# Patient Record
Sex: FEMALE | Race: WHITE | NOT HISPANIC OR LATINO | URBAN - METROPOLITAN AREA
[De-identification: names, ages, dates, MRNs, and addresses within clinical notes are randomized per-mention and may not be internally consistent; named-entity substitution may affect disease eponyms.]

---

## 2017-02-11 ENCOUNTER — EMERGENCY (EMERGENCY)
Facility: HOSPITAL | Age: 67
LOS: 1 days | Discharge: HOME | End: 2017-02-11
Attending: EMERGENCY MEDICINE | Admitting: EMERGENCY MEDICINE
Payer: COMMERCIAL

## 2017-02-11 VITALS
HEIGHT: 64 IN | DIASTOLIC BLOOD PRESSURE: 92 MMHG | HEART RATE: 78 BPM | TEMPERATURE: 98 F | WEIGHT: 134.92 LBS | OXYGEN SATURATION: 98 % | RESPIRATION RATE: 20 BRPM | SYSTOLIC BLOOD PRESSURE: 161 MMHG

## 2017-02-11 DIAGNOSIS — K46.9 UNSPECIFIED ABDOMINAL HERNIA WITHOUT OBSTRUCTION OR GANGRENE: ICD-10-CM

## 2017-02-11 DIAGNOSIS — I10 ESSENTIAL (PRIMARY) HYPERTENSION: ICD-10-CM

## 2017-02-11 DIAGNOSIS — Z98.891 HISTORY OF UTERINE SCAR FROM PREVIOUS SURGERY: Chronic | ICD-10-CM

## 2017-02-11 PROCEDURE — 99284 EMERGENCY DEPT VISIT MOD MDM: CPT | Mod: 25

## 2017-02-11 PROCEDURE — 99284 EMERGENCY DEPT VISIT MOD MDM: CPT

## 2017-02-11 RX ORDER — MORPHINE SULFATE 50 MG/1
4 CAPSULE, EXTENDED RELEASE ORAL ONCE
Qty: 0 | Refills: 0 | Status: DISCONTINUED | OUTPATIENT
Start: 2017-02-11 | End: 2017-02-11

## 2017-02-11 NOTE — ED PROVIDER NOTE - GASTROINTESTINAL, MLM
Abdomen soft, + hernia to epigastric region. non reducible. mild tenderness over hernia otherwise remainder of abd non tender. + BS. no guarding .no rebound.

## 2017-02-11 NOTE — ED PROVIDER NOTE - ATTENDING CONTRIBUTION TO CARE
66yof with known epigastric hernia p/w inability to self-reduce as she normally can. Seen by surgical resident here who was able to reduce hernia at bedside w/o analgesics. She will f/u outpatient w/surgery prn if she would like to pursue elective repair

## 2017-02-11 NOTE — ED PROVIDER NOTE - OBJECTIVE STATEMENT
65 y/o female c/o epigastric hernia. pt notes hernia for yrs  and has been coming out more often lately but able to self reduce it. pt notes felt hernia out around 11 am today and has been unable to self reduce hernia. Pt notes mild pain over hernia. no n/v/d. no fever or chills. LM yesterday. no urinary sx's. no further complaints.

## 2017-02-11 NOTE — ED PROVIDER NOTE - MEDICAL DECISION MAKING DETAILS
epigastric hernia. unable to reduce. pt well appearing. pt refused labs and pain meds. pt evaluated in ED by surgery and able to reduce hernia. recommend f/u as outpt.

## 2017-02-11 NOTE — CONSULT NOTE ADULT - PROBLEM SELECTOR RECOMMENDATION 9
- Hernia reduced at bedside with continual broad pressure over hernia and protruding contents.   - Patient instructed to follow up with Dr. Conley as an outpatient.   - Patient instructed to return to ED should symptoms recur.

## 2017-02-11 NOTE — ED ADULT NURSE NOTE - OBJECTIVE STATEMENT
Pt came in c/o abdominal pain due to hernia. Pt was sent in by MD to rule out bowel in hernia. Pt has +bowel sounds in all four quadrants and in hernia. Pt denies any N/V/D, no fever/chills. Pt denies CP, no SOB.

## 2017-02-11 NOTE — ED ADULT TRIAGE NOTE - CHIEF COMPLAINT QUOTE
Patient c/o abdominal pain started at 11am today , history of stangled epigastric hernia . Was sent by PMD for evaluation .

## 2017-02-11 NOTE — CONSULT NOTE ADULT - SUBJECTIVE AND OBJECTIVE BOX
HPI: 66YOF with no significant comorbidities presents to the ED with a long standing (~8 years) epigastric hernia that she was unable to reduce beginning at 11A-12P on the day of presentation. She denied nausea, vomiting, fever, chills, obstipation, constipation. Patient has no history of abdominal operations.      PMH: HTN    PSH:     All: NKDA    Vitals: AVSS    Exam:   Gen - NAD  CV - RRR  Resp - CTABL  Abd - Soft, ND, TTP @ Epigastric Hernia with Protruding Contents (No Associated Erythema)

## 2017-10-24 ENCOUNTER — OUTPATIENT (OUTPATIENT)
Dept: OUTPATIENT SERVICES | Facility: HOSPITAL | Age: 67
LOS: 1 days | Discharge: HOME | End: 2017-10-24
Payer: COMMERCIAL

## 2017-10-24 VITALS
HEIGHT: 64 IN | OXYGEN SATURATION: 100 % | SYSTOLIC BLOOD PRESSURE: 129 MMHG | RESPIRATION RATE: 16 BRPM | HEART RATE: 80 BPM | TEMPERATURE: 96 F | DIASTOLIC BLOOD PRESSURE: 81 MMHG | WEIGHT: 139.99 LBS

## 2017-10-24 VITALS
DIASTOLIC BLOOD PRESSURE: 77 MMHG | OXYGEN SATURATION: 97 % | SYSTOLIC BLOOD PRESSURE: 149 MMHG | HEART RATE: 62 BPM | RESPIRATION RATE: 18 BRPM

## 2017-10-24 DIAGNOSIS — Z98.891 HISTORY OF UTERINE SCAR FROM PREVIOUS SURGERY: Chronic | ICD-10-CM

## 2017-10-24 DIAGNOSIS — Z41.9 ENCOUNTER FOR PROCEDURE FOR PURPOSES OTHER THAN REMEDYING HEALTH STATE, UNSPECIFIED: Chronic | ICD-10-CM

## 2017-10-24 PROCEDURE — 49568: CPT

## 2017-10-24 PROCEDURE — 49560: CPT

## 2017-10-24 PROCEDURE — C1781: CPT

## 2017-10-24 RX ORDER — OXYCODONE AND ACETAMINOPHEN 5; 325 MG/1; MG/1
1 TABLET ORAL ONCE
Qty: 0 | Refills: 0 | Status: DISCONTINUED | OUTPATIENT
Start: 2017-10-24 | End: 2017-10-24

## 2017-10-24 RX ORDER — SODIUM CHLORIDE 9 MG/ML
1000 INJECTION, SOLUTION INTRAVENOUS
Qty: 0 | Refills: 0 | Status: DISCONTINUED | OUTPATIENT
Start: 2017-10-24 | End: 2017-10-24

## 2017-10-24 NOTE — BRIEF OPERATIVE NOTE - PROCEDURE
<<-----Click on this checkbox to enter Procedure Ventral hernia repair with mesh  10/24/2017    Active  [facility]

## 2017-10-30 DIAGNOSIS — K43.9 VENTRAL HERNIA WITHOUT OBSTRUCTION OR GANGRENE: ICD-10-CM

## 2017-10-30 DIAGNOSIS — I10 ESSENTIAL (PRIMARY) HYPERTENSION: ICD-10-CM

## 2023-12-21 PROBLEM — I10 ESSENTIAL (PRIMARY) HYPERTENSION: Chronic | Status: ACTIVE | Noted: 2017-02-11

## 2024-01-03 PROBLEM — Z00.00 ENCOUNTER FOR PREVENTIVE HEALTH EXAMINATION: Status: ACTIVE | Noted: 2024-01-03

## 2024-01-04 ENCOUNTER — APPOINTMENT (OUTPATIENT)
Dept: MAMMOGRAPHY | Facility: CLINIC | Age: 74
End: 2024-01-04
Payer: COMMERCIAL

## 2024-01-04 PROCEDURE — 76641 ULTRASOUND BREAST COMPLETE: CPT | Mod: 50

## 2024-01-04 PROCEDURE — 77063 BREAST TOMOSYNTHESIS BI: CPT

## 2024-01-04 PROCEDURE — 77067 SCR MAMMO BI INCL CAD: CPT
